# Patient Record
(demographics unavailable — no encounter records)

---

## 2025-07-29 NOTE — PHYSICAL EXAM
[Chaperoned Physical Exam] : A chaperone was present in the examining room during all aspects of the physical examination. [MA] : MA [Oriented x3] : oriented x3 [Examination Of The Breasts] : a normal appearance [No Masses] : no breast masses were palpable [Labia Majora] : normal [Labia Minora] : normal [Normal] : normal [Uterine Adnexae] : normal [FreeTextEntry2] :  Appropriately responsive, alert, and no acute distress.  [FreeTextEntry3] :  Thyroid is non-enlarged, nontender. No palpable nodules or goiter. No lymphadenopathy.  [FreeTextEntry7] :  Soft. Nondistended. Nontender. No rebound or guarding. There are no palpable masses.  [Vulvar Atrophy] : vulvar atrophy [Atrophy] : atrophy [FreeTextEntry1] :  External genitalia are within normal limits with no lesions visualized.  [FreeTextEntry4] : Scant amount of yellow vaginal discharge noted. Cultures obtained. No blood or lesions noted within the vaginal vault.  [FreeTextEntry5] :  A speculum was inserted without any difficulty. The cervix was normal in appearance. No cervical motion tenderness.  [FreeTextEntry6] : Bimanual examination was notable for a normal, nontender uterus. There were no palpable adnexal masses or adnexal tenderness.  [FreeTextEntry9] :  No lesions. No palpable masses.

## 2025-07-29 NOTE — HISTORY OF PRESENT ILLNESS
[Patient reported mammogram was normal] : Patient reported mammogram was normal [LMP unknown] : LMP unknown [postmenopausal] : postmenopausal [N] : Patient is not sexually active [Y] : Positive pregnancy history [Hot Flashes] : hot flashes [unknown] : Patient is unsure of the date of her LMP [Menarche Age: ____] : age at menarche was [unfilled] [Experiencing Menopausal Sxs] : experiencing menopausal symptoms [Menopause Age: ____] : age at menopause was [unfilled] [No] : Patient does not have concerns regarding sex [Previously active] : previously active [TextBox_4] : 64 -year old  1 para 1 postmenopausal presents for an annual examination. Review of systems are notable for mild hot flashes and vaginal dryness.   [Mammogramdate] : 07/19/2025 [TextBox_19] : BIRADS-2 [PapSmeardate] : 08/27/2024 [TextBox_31] : Atrophy [BoneDensityDate] : 04/18/2023 [ColonoscopyDate] : 2020 [PGxTotal] : 1 [[facility] CenterxFulerm] : 1 [[facility] Hospital[facility]] : 1

## 2025-07-29 NOTE — PLAN
[FreeTextEntry1] : Wellness exam. Normal physical examination. Recommendations: bone density screening, ophthalmological, dental, and dermatological examinations.  Status post mammography July 19, 2025; WNL. Status post colonoscopy 2020.   Leukorrhea. Cultures pending.   Hot flashes. Information and a sample of Thermella given. Atrophic vaginitis. Information and sample of [place] given.  Discussed proper nutrition and physical exercise. Reviewed age-appropriate vaccinations.